# Patient Record
(demographics unavailable — no encounter records)

---

## 2020-01-01 NOTE — DS.PDOC
[facility] Discharge Summary


General


Date of Birth


20


Date of Discharge


Sep 26, 2020 at 13:00





Procedures During Visit


Hearing screen and BiliChek were performed.





History


This is a baby term female born at 39-5/7 weeks of gestational age via induced 

vaginal delivery to a 29-year-old  (G) 4 para (P) now 2  mother who is 

blood type O positive, hepatitis B negative, rapid plasma reagin (RPR) negative,

HIV negative, group B Streptococcus negative. Pregnancy was complicated by 

preeclampsia. Rupture of membranes occurred 26 hours and 43 minutes prior to 

delivery with clear fluid. Apgar scores were 8 at one minute and 9 at five 

minutes. Baby was admitted to the Mother-Baby unit.





Exam on Admission to Nursery


Measurements on Admission


On admission, the baby's weight is 3700 grams which is 8 pounds and 3 ounces, 

length is 20 inches, and head circumference is 13 inches .


General:  Positive: Active, Other (appropriately responsive); 


   Negative: Dysmorphic Features


HEENT:  Positive: Normocephalic, Anterior Champion Open, Positive Red Reflexes

Andrea


Heart:  Positive: S1,S2; 


   Negative: Murmur


Lungs:  Positive: Good Bilateral Air Entry; 


   Negative: Grunting and Retractions


Abdomen:  Positive: Soft; 


   Negative: Distended


Female Genitalia:  Positive: Normal Term Genitalia


Anus:  Positive: Patent


Extremities:  Positive: Other (both hips stable with normal Ortolani and Vega 

maneuvers)


Skin:  Positive: Normal for Gestation


Neurological:  POSITIVE: Good Tone, Positive Enrique Reflex





Summary Text


On the day of discharge, the baby's weight is 3580 grams which is 7 pounds and 

14 ounces and the baby is breast-feeding well.


Physical Examination was within normal limits. The child was quiet but 

appropriately responsive. She had good color and perfusion. She was breathing 

comfortably with clear breath sounds. Her heart was regular with no murmur and 

her abdomen was soft and nondistended..


The baby passed a hearing screen, received the first dose of hepatitis B vaccine

on . The baby's blood type is O positive. Bilirubin check is 4.6 at 33 hours

of life.


The child's follow-up care is going to be at the [facility] and is scheduled

on . I faxed a summary of the child's Hospital course to the office.


The child was evaluated for possible sepsis due to prolonged rupture of 

membranes. Her CBC with differential was normal and her blood culture is no 

growth. She did not require any treatment with antibiotics. She did not show any

clinical signs of sepsis..











Cam Larsen MD                  Sep 26, 2020 18:44

## 2020-01-01 NOTE — NBADM
[facility] Admission Note


Date of Admission


Sep 24, 2020 at 19:59





History


This is a baby term female born at 39-5/7 weeks of gestational age via induced 

vaginal delivery to a 29-year-old  (G) 4 para (P) now 2  mother who is 

blood type O positive, hepatitis B negative, rapid plasma reagin (RPR) negative,

HIV negative, group B Streptococcus negative. Pregnancy was complicated by 

preeclampsia. Rupture of membranes occurred 26 hours and 43 minutes prior to 

delivery with clear fluid. Apgar scores were 8 at one minute and 9 at five 

minutes. Baby was admitted to the Mother-Baby unit.





Physical Examination


Physical Measurements


On admission, the baby's weight is 3700 grams which is 8 pounds and 3 ounces, 

length is 20 inches, and head circumference is 13 inches .


Vital Signs





Vital Signs








  Date Time  Temp Pulse Resp B/P (MAP) Pulse Ox O2 Delivery O2 Flow Rate FiO2


 


20 20:59 98.0 153 55 71/39 (50)  Room Air  








General:  Positive: Active, Other (appropriately responsive); 


   Negative: Dysmorphic Features


HEENT:  Positive: Normocephalic, Anterior [place] Open, Positive Red Reflexes

Andrea


Heart:  Positive: S1,S2; 


   Negative: Murmur


Lungs:  Positive: Good Bilateral Air Entry; 


   Negative: Grunting and Retractions


Abdomen:  Positive: Soft; 


   Negative: Distended


Female Genitalia:  Positive: Normal Term Genitalia


Anus:  Positive: Patent


Extremities:  Positive: Other (both hips stable with normal Ortolani and Vega 

maneuvers)


Skin:  Positive: Normal for Gestation


Neurological:  POSITIVE: Good Tone, Positive Glenvil Reflex





Asessment


Problems:  


(1) Healthy female 


(2) At risk for sepsis


Problem Text:  The only risk factor for possible sepsis's prolonged rupture of 

membranes. The child does not show any clinical signs of sepsis and her CBC with

differential was normal. A blood culture is pending.








Plan


1. Admit to mother-baby unit.


2. Routine  care.


3. Both parents updated on condition and plan for the baby.











Cam Larsen MD                  Sep 25, 2020 12:53